# Patient Record
Sex: FEMALE | Race: OTHER | HISPANIC OR LATINO | Employment: UNEMPLOYED | URBAN - METROPOLITAN AREA
[De-identification: names, ages, dates, MRNs, and addresses within clinical notes are randomized per-mention and may not be internally consistent; named-entity substitution may affect disease eponyms.]

---

## 2021-03-13 ENCOUNTER — APPOINTMENT (EMERGENCY)
Dept: RADIOLOGY | Facility: HOSPITAL | Age: 71
End: 2021-03-13
Payer: COMMERCIAL

## 2021-03-13 ENCOUNTER — HOSPITAL ENCOUNTER (EMERGENCY)
Facility: HOSPITAL | Age: 71
Discharge: HOME | End: 2021-03-13
Attending: EMERGENCY MEDICINE | Admitting: EMERGENCY MEDICINE
Payer: COMMERCIAL

## 2021-03-13 VITALS
WEIGHT: 169.75 LBS | TEMPERATURE: 98 F | HEART RATE: 74 BPM | SYSTOLIC BLOOD PRESSURE: 151 MMHG | OXYGEN SATURATION: 100 % | RESPIRATION RATE: 20 BRPM | DIASTOLIC BLOOD PRESSURE: 86 MMHG

## 2021-03-13 DIAGNOSIS — R07.9 CHEST PAIN, UNSPECIFIED: Primary | ICD-10-CM

## 2021-03-13 LAB
ALBUMIN SERPL BCP-MCNC: 4.4 G/DL (ref 3–5.2)
ALP SERPL-CCNC: 78 U/L (ref 43–122)
ALT SERPL W P-5'-P-CCNC: 46 U/L (ref 9–52)
ANION GAP SERPL CALCULATED.3IONS-SCNC: 4 MMOL/L (ref 5–14)
APTT PPP: 27 SECONDS (ref 23–37)
AST SERPL W P-5'-P-CCNC: 59 U/L (ref 14–36)
ATRIAL RATE: 56 BPM
ATRIAL RATE: 71 BPM
BACTERIA UR QL AUTO: NORMAL /HPF
BASOPHILS # BLD AUTO: 0 THOUSANDS/ΜL (ref 0–0.1)
BASOPHILS NFR BLD AUTO: 0 % (ref 0–1)
BILIRUB SERPL-MCNC: 0.5 MG/DL
BILIRUB UR QL STRIP: NEGATIVE
BUN SERPL-MCNC: 16 MG/DL (ref 5–25)
CALCIUM SERPL-MCNC: 9.4 MG/DL (ref 8.4–10.2)
CHLORIDE SERPL-SCNC: 102 MMOL/L (ref 97–108)
CLARITY UR: CLEAR
CO2 SERPL-SCNC: 32 MMOL/L (ref 22–30)
COLOR UR: ABNORMAL
CREAT SERPL-MCNC: 0.73 MG/DL (ref 0.6–1.2)
EOSINOPHIL # BLD AUTO: 0.1 THOUSAND/ΜL (ref 0–0.4)
EOSINOPHIL NFR BLD AUTO: 1 % (ref 0–6)
ERYTHROCYTE [DISTWIDTH] IN BLOOD BY AUTOMATED COUNT: 13.5 %
GFR SERPL CREATININE-BSD FRML MDRD: 85 ML/MIN/1.73SQ M
GLUCOSE SERPL-MCNC: 96 MG/DL (ref 70–99)
GLUCOSE UR STRIP-MCNC: NEGATIVE MG/DL
HCT VFR BLD AUTO: 40.4 % (ref 36–46)
HGB BLD-MCNC: 13.2 G/DL (ref 12–16)
HGB UR QL STRIP.AUTO: 10
INR PPP: 0.96 (ref 0.84–1.19)
KETONES UR STRIP-MCNC: NEGATIVE MG/DL
LEUKOCYTE ESTERASE UR QL STRIP: 25
LYMPHOCYTES # BLD AUTO: 1.6 THOUSANDS/ΜL (ref 0.5–4)
LYMPHOCYTES NFR BLD AUTO: 22 % (ref 25–45)
MCH RBC QN AUTO: 29.2 PG (ref 26–34)
MCHC RBC AUTO-ENTMCNC: 32.6 G/DL (ref 31–36)
MCV RBC AUTO: 90 FL (ref 80–100)
MONOCYTES # BLD AUTO: 0.5 THOUSAND/ΜL (ref 0.2–0.9)
MONOCYTES NFR BLD AUTO: 6 % (ref 1–10)
NEUTROPHILS # BLD AUTO: 5.1 THOUSANDS/ΜL (ref 1.8–7.8)
NEUTS SEG NFR BLD AUTO: 70 % (ref 45–65)
NITRITE UR QL STRIP: NEGATIVE
NON-SQ EPI CELLS URNS QL MICRO: NORMAL /HPF
NT-PROBNP SERPL-MCNC: 227 PG/ML (ref 0–299)
P AXIS: 57 DEGREES
P AXIS: 61 DEGREES
PH UR STRIP.AUTO: 7 [PH]
PLATELET # BLD AUTO: 203 THOUSANDS/UL (ref 150–450)
PMV BLD AUTO: 11.9 FL (ref 8.9–12.7)
POTASSIUM SERPL-SCNC: 4.3 MMOL/L (ref 3.6–5)
PR INTERVAL: 150 MS
PR INTERVAL: 156 MS
PROT SERPL-MCNC: 7.8 G/DL (ref 5.9–8.4)
PROT UR STRIP-MCNC: NEGATIVE MG/DL
PROTHROMBIN TIME: 12.9 SECONDS (ref 11.6–14.5)
QRS AXIS: 24 DEGREES
QRS AXIS: 69 DEGREES
QRSD INTERVAL: 124 MS
QRSD INTERVAL: 130 MS
QT INTERVAL: 420 MS
QT INTERVAL: 446 MS
QTC INTERVAL: 430 MS
QTC INTERVAL: 456 MS
RBC # BLD AUTO: 4.51 MILLION/UL (ref 4–5.2)
RBC #/AREA URNS AUTO: NORMAL /HPF
SODIUM SERPL-SCNC: 138 MMOL/L (ref 137–147)
SP GR UR STRIP.AUTO: 1.01 (ref 1–1.04)
T WAVE AXIS: 27 DEGREES
T WAVE AXIS: 48 DEGREES
TROPONIN I SERPL-MCNC: <0.01 NG/ML (ref 0–0.03)
TROPONIN I SERPL-MCNC: <0.01 NG/ML (ref 0–0.03)
UROBILINOGEN UA: NEGATIVE MG/DL
VENTRICULAR RATE: 56 BPM
VENTRICULAR RATE: 71 BPM
WBC # BLD AUTO: 7.3 THOUSAND/UL (ref 4.5–11)
WBC #/AREA URNS AUTO: NORMAL /HPF

## 2021-03-13 PROCEDURE — 93010 ELECTROCARDIOGRAM REPORT: CPT | Performed by: INTERNAL MEDICINE

## 2021-03-13 PROCEDURE — 93005 ELECTROCARDIOGRAM TRACING: CPT

## 2021-03-13 PROCEDURE — 99285 EMERGENCY DEPT VISIT HI MDM: CPT

## 2021-03-13 PROCEDURE — 83880 ASSAY OF NATRIURETIC PEPTIDE: CPT | Performed by: PHYSICIAN ASSISTANT

## 2021-03-13 PROCEDURE — 81001 URINALYSIS AUTO W/SCOPE: CPT | Performed by: PHYSICIAN ASSISTANT

## 2021-03-13 PROCEDURE — 99285 EMERGENCY DEPT VISIT HI MDM: CPT | Performed by: PHYSICIAN ASSISTANT

## 2021-03-13 PROCEDURE — 84484 ASSAY OF TROPONIN QUANT: CPT | Performed by: PHYSICIAN ASSISTANT

## 2021-03-13 PROCEDURE — 71045 X-RAY EXAM CHEST 1 VIEW: CPT

## 2021-03-13 PROCEDURE — 36415 COLL VENOUS BLD VENIPUNCTURE: CPT | Performed by: PHYSICIAN ASSISTANT

## 2021-03-13 PROCEDURE — 80053 COMPREHEN METABOLIC PANEL: CPT | Performed by: PHYSICIAN ASSISTANT

## 2021-03-13 PROCEDURE — 85610 PROTHROMBIN TIME: CPT | Performed by: PHYSICIAN ASSISTANT

## 2021-03-13 PROCEDURE — 81003 URINALYSIS AUTO W/O SCOPE: CPT | Performed by: PHYSICIAN ASSISTANT

## 2021-03-13 PROCEDURE — 85730 THROMBOPLASTIN TIME PARTIAL: CPT | Performed by: PHYSICIAN ASSISTANT

## 2021-03-13 PROCEDURE — 85025 COMPLETE CBC W/AUTO DIFF WBC: CPT | Performed by: PHYSICIAN ASSISTANT

## 2021-03-13 RX ORDER — ACETAMINOPHEN 325 MG/1
650 TABLET ORAL ONCE
Status: COMPLETED | OUTPATIENT
Start: 2021-03-13 | End: 2021-03-13

## 2021-03-13 RX ADMIN — ACETAMINOPHEN 650 MG: 325 TABLET, FILM COATED ORAL at 11:23

## 2021-03-13 NOTE — ED NOTES
Completed repeat EKG and troponin  Patient resting quietly in bed pain free no c/o of headache currently       Sebastián Lucero RN  03/13/21 2741

## 2021-03-13 NOTE — ED PROVIDER NOTES
History  Chief Complaint   Patient presents with    Chest Pain     and shortness of breath for 2 days via       Patient is a 77 y/o female with hx of asthma and HTN, presents to the ED for evaluation of chest pain  Pt states she is visiting from [place], has had chest pain for awhile, but 2 days ago began with constant left sided chest pain with radiating pain into jaw/face  Pt does take Plavix and 81mg ASA daily, unable to say why she takes this medication  Pt denies fever, SOB, nausea, vomiting, diaphoresis, recent travel, sick contacts, leg pain/swelling  None       Past Medical History:   Diagnosis Date    Asthma     Hypertension        Past Surgical History:   Procedure Laterality Date     SECTION      CHOLECYSTECTOMY      FOOT FRACTURE SURGERY Right        History reviewed  No pertinent family history  I have reviewed and agree with the history as documented  E-Cigarette/Vaping     E-Cigarette/Vaping Substances     Social History     Tobacco Use    Smoking status: Never Smoker    Smokeless tobacco: Never Used   Substance Use Topics    Alcohol use: Not Currently    Drug use: Not Currently       Review of Systems   Constitutional: Negative for chills and fever  HENT: Negative for ear pain and sore throat  Eyes: Negative for visual disturbance  Respiratory: Negative for cough and shortness of breath  Cardiovascular: Positive for chest pain  Negative for palpitations and leg swelling  Gastrointestinal: Negative for abdominal pain, diarrhea, nausea and vomiting  Genitourinary: Negative for dysuria and hematuria  Musculoskeletal: Negative for back pain and neck pain  Skin: Negative for rash  Neurological: Negative for speech difficulty and headaches  Psychiatric/Behavioral: Negative for confusion  Physical Exam  Physical Exam  Constitutional:       General: She is not in acute distress  Appearance: She is well-developed   She is not ill-appearing, toxic-appearing or diaphoretic  HENT:      Head: Normocephalic and atraumatic  Right Ear: External ear normal       Left Ear: External ear normal       Nose: Nose normal       Mouth/Throat:      Lips: Pink  Mouth: Mucous membranes are moist    Eyes:      Extraocular Movements: Extraocular movements intact  Conjunctiva/sclera: Conjunctivae normal    Neck:      Musculoskeletal: Normal range of motion and neck supple  Cardiovascular:      Rate and Rhythm: Normal rate and regular rhythm  Heart sounds: No murmur  No friction rub  No gallop  Pulmonary:      Effort: Pulmonary effort is normal  No tachypnea or respiratory distress  Breath sounds: Normal breath sounds  No decreased breath sounds, wheezing, rhonchi or rales  Chest:      Chest wall: Tenderness present  Musculoskeletal:      Right lower leg: Normal  She exhibits no tenderness, no bony tenderness and no swelling  No edema  Left lower leg: Normal  She exhibits no tenderness, no bony tenderness and no swelling  No edema  Skin:     General: Skin is warm and dry  Capillary Refill: Capillary refill takes less than 2 seconds  Neurological:      Mental Status: She is alert and oriented to person, place, and time  GCS: GCS eye subscore is 4  GCS verbal subscore is 5  GCS motor subscore is 6     Psychiatric:         Mood and Affect: Mood and affect normal          Speech: Speech normal          Vital Signs  ED Triage Vitals   Temperature Pulse Respirations Blood Pressure SpO2   03/13/21 0922 03/13/21 0922 03/13/21 0922 03/13/21 0922 03/13/21 0922   98 °F (36 7 °C) 85 18 142/77 97 %      Temp Source Heart Rate Source Patient Position - Orthostatic VS BP Location FiO2 (%)   03/13/21 0922 03/13/21 0922 03/13/21 0922 03/13/21 0922 --   Tympanic Monitor Sitting Left arm       Pain Score       03/13/21 1123       5           Vitals:    03/13/21 1125 03/13/21 1130 03/13/21 1256 03/13/21 1332   BP:  140/78 161/83 151/86   Pulse: 82 60  74   Patient Position - Orthostatic VS:  Lying Lying Lying         Visual Acuity      ED Medications  Medications   acetaminophen (TYLENOL) tablet 650 mg (650 mg Oral Given 3/13/21 1123)       Diagnostic Studies  Results Reviewed     Procedure Component Value Units Date/Time    Troponin I repeat in 3hrs [[id number]]  (Normal) Collected: 03/13/21 1249    Lab Status: Final result Specimen: Blood from Arm, Left Updated: 03/13/21 1319     Troponin I <0 01 ng/mL     Troponin I [[id number]]  (Normal) Collected: 03/13/21 0931    Lab Status: Final result Specimen: Blood from Arm, Left Updated: 03/13/21 1036     Troponin I <0 01 ng/mL     Urine Microscopic [[id number]]  (Normal) Collected: 03/13/21 0954    Lab Status: Final result Specimen: Urine, Clean Catch Updated: 03/13/21 1034     RBC, UA 0-1 /hpf      WBC, UA 1-2 /hpf      Epithelial Cells Occasional /hpf      Bacteria, UA None Seen /hpf     NT-BNP PRO [[id number]]  (Normal) Collected: 03/13/21 0931    Lab Status: Final result Specimen: Blood from Arm, Left Updated: 03/13/21 1034     NT-proBNP 227 pg/mL     UA w Reflex to Microscopic w Reflex to Culture [[id number]]  (Abnormal) Collected: 03/13/21 0954    Lab Status: Final result Specimen: Urine, Clean Catch Updated: 03/13/21 1033     Color, UA Straw     Clarity, UA Clear     Specific Gravity, UA 1 010     pH, UA 7 0     Leukocytes, UA 25 0     Nitrite, UA Negative     Protein, UA Negative mg/dl      Glucose, UA Negative mg/dl      Ketones, UA Negative mg/dl      Bilirubin, UA Negative     Blood, UA 10 0     UROBILINOGEN UA Negative mg/dL     Comprehensive metabolic panel [[id number]]  (Abnormal) Collected: 03/13/21 0931    Lab Status: Final result Specimen: Blood from Arm, Left Updated: 03/13/21 1029     Sodium 138 mmol/L      Potassium 4 3 mmol/L      Chloride 102 mmol/L      CO2 32 mmol/L      ANION GAP 4 mmol/L      BUN 16 mg/dL      Creatinine 0 73 mg/dL      Glucose 96 mg/dL      Calcium 9 4 mg/dL      AST 59 U/L      ALT 46 U/L      Alkaline Phosphatase 78 U/L      Total Protein 7 8 g/dL      Albumin 4 4 g/dL      Total Bilirubin 0 50 mg/dL      eGFR 85 ml/min/1 73sq m     Narrative:      [place] guidelines for Chronic Kidney Disease (CKD):     Stage 1 with normal or high GFR (GFR > 90 mL/min/1 73 square meters)    Stage 2 Mild CKD (GFR = 60-89 mL/min/1 73 square meters)    Stage 3A Moderate CKD (GFR = 45-59 mL/min/1 73 square meters)    Stage 3B Moderate CKD (GFR = 30-44 mL/min/1 73 square meters)    Stage 4 Severe CKD (GFR = 15-29 mL/min/1 73 square meters)    Stage 5 End Stage CKD (GFR <15 mL/min/1 73 square meters)  Note: GFR calculation is accurate only with a steady state creatinine    Protime-INR [[id number]]  (Normal) Collected: 03/13/21 0930    Lab Status: Final result Specimen: Blood from Arm, Left Updated: 03/13/21 1013     Protime 12 9 seconds      INR 0 96    APTT [[id number]]  (Normal) Collected: 03/13/21 0930    Lab Status: Final result Specimen: Blood from Arm, Left Updated: 03/13/21 1013     PTT 27 seconds     CBC and differential [[id number]]  (Abnormal) Collected: 03/13/21 0930    Lab Status: Final result Specimen: Blood from Arm, Left Updated: 03/13/21 1011     WBC 7 30 Thousand/uL      RBC 4 51 Million/uL      Hemoglobin 13 2 g/dL      Hematocrit 40 4 %      MCV 90 fL      MCH 29 2 pg      MCHC 32 6 g/dL      RDW 13 5 %      MPV 11 9 fL      Platelets 415 Thousands/uL      Neutrophils Relative 70 %      Lymphocytes Relative 22 %      Monocytes Relative 6 %      Eosinophils Relative 1 %      Basophils Relative 0 %      Neutrophils Absolute 5 10 Thousands/µL      Lymphocytes Absolute 1 60 Thousands/µL      Monocytes Absolute 0 50 Thousand/µL      Eosinophils Absolute 0 10 Thousand/µL      Basophils Absolute 0 00 Thousands/µL                  XR chest 1 view portable   ED Interpretation by Luís Braden PA-C (03/13 5773)   No acute cardiopulmonary disease seen by me                 Procedures  ECG 12 Lead Documentation Only    Date/Time: 3/13/2021 9:44 AM  Performed by: Mayra Salgado PA-C  Authorized by: Mayra Salgado PA-C     Indications / Diagnosis:  Cp  ECG reviewed by me, the ED Provider: yes    Patient location:  ED  Previous ECG:     Previous ECG:  Unavailable    Comparison to cardiac monitor: Yes    Interpretation:     Interpretation: abnormal    Rate:     ECG rate:  71    ECG rate assessment: normal    Rhythm:     Rhythm: sinus rhythm    Ectopy:     Ectopy: none    QRS:     QRS axis:  Normal    QRS intervals:  Normal  Conduction:     Conduction: abnormal      Abnormal conduction: complete RBBB    ST segments:     ST segments:  Non-specific  T waves:     T waves: non-specific    Comments:      QT/QTc: 420/456  No STEMI    ECG 12 Lead Documentation Only    Date/Time: 3/13/2021 1:07 PM  Performed by: Mayra Salgado PA-C  Authorized by: Mayra Salgado PA-C     Indications / Diagnosis:  Delta  ECG reviewed by me, the ED Provider: yes    Patient location:  ED  Previous ECG:     Previous ECG:  Compared to current    Similarity:  No change    Comparison to cardiac monitor: Yes    Interpretation:     Interpretation: abnormal    Rate:     ECG rate:  56    ECG rate assessment: bradycardic    Rhythm:     Rhythm: sinus bradycardia    Ectopy:     Ectopy: none    QRS:     QRS axis:  Normal    QRS intervals:  Normal  Conduction:     Conduction: abnormal      Abnormal conduction: complete RBBB    ST segments:     ST segments:  Normal  T waves:     T waves: normal    Comments:      QT/QTc: 446/430   No STEMI             ED Course  ED Course as of Mar 14 0825   Sat Mar 13, 2021   1045 HEART score = 3, will obtain delta troponin and EKG   Troponin I: <0 01   1327 delta   Troponin I: <0 01             HEART Risk Score      Most Recent Value   Heart Score Risk Calculator   History  0 Filed at: 03/13/2021 1029   ECG  0 Filed at: 03/13/2021 1029   Age  2 Filed at: 03/13/2021 1029   Risk Factors  1 Filed at: 03/13/2021 1029   Troponin  0 Filed at: 03/13/2021 1029   HEART Score  3 Filed at: 03/13/2021 1029                      SBIRT 20yo+      Most Recent Value   SBIRT (23 yo +)   In order to provide better care to our patients, we are screening all of our patients for alcohol and drug use  Would it be okay to ask you these screening questions? Yes Filed at: 03/13/2021 0932   Initial Alcohol Screen: US AUDIT-C    1  How often do you have a drink containing alcohol?  0 Filed at: 03/13/2021 0932   2  How many drinks containing alcohol do you have on a typical day you are drinking? 0 Filed at: 03/13/2021 0932   3a  Male UNDER 65: How often do you have five or more drinks on one occasion? 0 Filed at: 03/13/2021 0932   3b  FEMALE Any Age, or MALE 65+: How often do you have 4 or more drinks on one occassion? 0 Filed at: 03/13/2021 0932   Audit-C Score  0 Filed at: 03/13/2021 4948   DANNA: How many times in the past year have you    Used an illegal drug or used a prescription medication for non-medical reasons? Never Filed at: 03/13/2021 0932                    MDM  Number of Diagnoses or Management Options  Chest pain, unspecified: new and requires workup  Diagnosis management comments: Patient is a 77 y/o female with hx of asthma and HTN, presents to the ED for evaluation of chest pain  Pt states she is visiting from [place], has had chest pain for awhile, but 2 days ago began with constant left sided chest pain with radiating pain into jaw/face  Pt does take Plavix and 81mg ASA daily, unable to say why she takes this medication       EKG shows no acute ST abnormality  CXR shows no acute cardiopulmonary disease  Initial troponin negative, HEART score = 3, delta troponin and EKG obtained  Delta troponin negative  Repeat EKG shows no change  Chest wall tenderness, low clinical suspicion for ACS etiology    Patient does have PCP in [place] and patient is returning there soon, recommended she f/u with PCP in [place] this week for re-evaluation  Return if sx change/worsen  Patient verbalizes understanding and agrees with plan  The management plan was discussed in detail with the patient at bedside and all questions were answered  Prior to discharge, I provided both verbal and written instructions  I discussed with the patient the signs and symptoms for which to return to the emergency department  All questions were answered and patient was comfortable with the plan of care and discharged to home  The patient agrees to return to the Emergency Department for concerns and/or progression of illness  Disposition  Final diagnoses:   Chest pain, unspecified     Time reflects when diagnosis was documented in both MDM as applicable and the Disposition within this note     Time User Action Codes Description Comment    3/13/2021  1:34 PM Makayla Ann Add [R07 [id number]] Chest pain, unspecified       ED Disposition     ED Disposition Condition Date/Time Comment    Discharge Stable Sat Mar 13, 2021  1:34 PM Nanda Hua discharge to home/self care  Follow-up Information     Follow up With Specialties Details Why Contact Info Additional [facility] Schedule an appointment as soon as possible for a visit   [address], [address] [facility] [id number]  [address] [place] [address], [address], [address], [place], [place] [address] [facility] Emergency Department Emergency Medicine Go to  If symptoms worsen [address] [id number] [address] [facility] Heart Emergency Department          There are no discharge medications for this patient  No discharge procedures on file      PDMP Review     None          ED Provider  Electronically Signed by           Mayra Salgado JAD  03/14/21 0825

## 2021-03-13 NOTE — ED NOTES
Patient states chest pressure on Right anterior chest that goes into shoulder and jaw which started approximately  2 days ago    C/O tenderness with palpation on chest reviewed ariana GODINEZ  Labs pending obtaining urine now      Uriel Michele RN  03/13/21 1150 [facility], [address] [place] [address]  03/13/21 2954
